# Patient Record
Sex: MALE | Race: WHITE | Employment: OTHER | URBAN - METROPOLITAN AREA
[De-identification: names, ages, dates, MRNs, and addresses within clinical notes are randomized per-mention and may not be internally consistent; named-entity substitution may affect disease eponyms.]

---

## 2023-11-07 ENCOUNTER — OFFICE VISIT (OUTPATIENT)
Dept: ENT CLINIC | Age: 73
End: 2023-11-07
Payer: COMMERCIAL

## 2023-11-07 VITALS
TEMPERATURE: 97.5 F | BODY MASS INDEX: 28.32 KG/M2 | HEIGHT: 65 IN | HEART RATE: 64 BPM | DIASTOLIC BLOOD PRESSURE: 81 MMHG | SYSTOLIC BLOOD PRESSURE: 139 MMHG | OXYGEN SATURATION: 98 % | WEIGHT: 170 LBS | RESPIRATION RATE: 12 BRPM

## 2023-11-07 DIAGNOSIS — Z01.818 PREOP TESTING: ICD-10-CM

## 2023-11-07 DIAGNOSIS — H61.23 BILATERAL IMPACTED CERUMEN: ICD-10-CM

## 2023-11-07 DIAGNOSIS — H93.8X1 MASS OF EAR CANAL, RIGHT: Primary | ICD-10-CM

## 2023-11-07 PROCEDURE — 69210 REMOVE IMPACTED EAR WAX UNI: CPT

## 2023-11-07 PROCEDURE — 99204 OFFICE O/P NEW MOD 45 MIN: CPT

## 2023-11-07 PROCEDURE — 1123F ACP DISCUSS/DSCN MKR DOCD: CPT

## 2023-11-07 RX ORDER — PROPRANOLOL HYDROCHLORIDE 120 MG/1
120 CAPSULE, EXTENDED RELEASE ORAL DAILY
COMMUNITY
Start: 2023-09-20

## 2023-11-07 RX ORDER — AZELASTINE HYDROCHLORIDE, FLUTICASONE PROPIONATE 137; 50 UG/1; UG/1
SPRAY, METERED NASAL
COMMUNITY
Start: 2023-09-13

## 2023-11-07 RX ORDER — LORAZEPAM 0.5 MG/1
TABLET ORAL
COMMUNITY
Start: 2023-10-09

## 2023-11-07 RX ORDER — FLUOCINONIDE 0.5 MG/G
OINTMENT TOPICAL
COMMUNITY
Start: 2023-10-18

## 2023-11-07 RX ORDER — CLOBETASOL PROPIONATE 0.46 MG/ML
SOLUTION TOPICAL
COMMUNITY
Start: 2023-10-24

## 2023-11-07 RX ORDER — ATOMOXETINE 80 MG/1
CAPSULE ORAL
COMMUNITY
Start: 2023-09-03

## 2023-11-07 RX ORDER — LISINOPRIL 40 MG/1
40 TABLET ORAL DAILY
COMMUNITY
Start: 2023-10-12

## 2023-11-07 RX ORDER — EZETIMIBE 10 MG/1
10 TABLET ORAL DAILY
COMMUNITY
Start: 2023-09-15

## 2023-11-07 RX ORDER — MONTELUKAST SODIUM 10 MG/1
10 TABLET ORAL DAILY
COMMUNITY
Start: 2023-09-19

## 2023-11-07 RX ORDER — ATORVASTATIN CALCIUM 80 MG/1
80 TABLET, FILM COATED ORAL DAILY
COMMUNITY
Start: 2023-10-16

## 2023-11-07 RX ORDER — SELENIUM SULFIDE 2.5 MG/100ML
LOTION TOPICAL
COMMUNITY
Start: 2023-08-29

## 2023-11-07 RX ORDER — CALCIPOTRIENE 50 UG/G
CREAM TOPICAL
COMMUNITY
Start: 2023-10-24

## 2023-11-07 RX ORDER — LEVOTHYROXINE SODIUM 0.07 MG/1
TABLET ORAL
COMMUNITY
Start: 2023-10-09

## 2023-11-07 ASSESSMENT — ENCOUNTER SYMPTOMS
SINUS PRESSURE: 0
BACK PAIN: 0
COUGH: 0
EYE PAIN: 0
RHINORRHEA: 0
VOMITING: 0
SHORTNESS OF BREATH: 0
DIARRHEA: 0
EYE DISCHARGE: 0
SORE THROAT: 0
ALLERGIC/IMMUNOLOGIC NEGATIVE: 1

## 2023-11-07 NOTE — PROGRESS NOTES
Subjective:      Patient ID:  Corey Thomason is a 72 y.o. male. HPI:    Patient presents today with a mild problem of the bilateral ear. It started 1 year ago. Patient states that nothing makes it better and nothing makes it worse. Pain: no   Side: bilateral  Drainage:  no   Side: bilateral   Trauma history to the ear:    Side: bilateral   Desc:   none  Hearing Aids: no  History of surgery to the head/neck: no   Description: none  History of cerumen impaction: no  History of noise exposure: yes   Type: playing in bands  Spinning: no  Hearing loss: no    Fluctuating: no  Aural pressure: yes  Tinnitus: yes - does report bilateral hissing sound that is non-pulsatile and constant in nature. Otalgia: no    Patient has long history of severe allergies. Has had immunotherapy in the past.   August of 2022 patient started to have return of allergy symptoms. In November of 2022 started to experience sensation of cotton in ears. Was referred by Dr. Christophe Nuñez. Has been on Dimista and singular and has started to improve allergy symptoms over the past month or two. History reviewed. No pertinent past medical history. History reviewed. No pertinent surgical history. History reviewed. No pertinent family history. Social History     Socioeconomic History    Marital status: Single     Spouse name: None    Number of children: None    Years of education: None    Highest education level: None   Tobacco Use    Smoking status: Never    Smokeless tobacco: Never   Substance and Sexual Activity    Alcohol use: Yes     Comment: socially    Drug use: Never     Allergies   Allergen Reactions    Penicillins     Sulfa Antibiotics          Review of Systems   Constitutional:  Negative for chills and fever. HENT:  Positive for congestion, ear pain, hearing loss and tinnitus. Negative for ear discharge, postnasal drip, rhinorrhea, sinus pressure, sneezing and sore throat. Eyes:  Negative for pain and discharge.

## 2023-11-15 ENCOUNTER — HOSPITAL ENCOUNTER (OUTPATIENT)
Age: 73
Discharge: HOME | End: 2023-11-15
Payer: COMMERCIAL

## 2023-11-15 DIAGNOSIS — Z01.818 PREOP TESTING: ICD-10-CM

## 2023-11-15 LAB
BUN SERPL-MCNC: 12 MG/DL (ref 6–23)
CREAT SERPL-MCNC: 0.8 MG/DL (ref 0.7–1.2)
GFR SERPL CREATININE-BSD FRML MDRD: >60 ML/MIN/1.73M2

## 2023-11-15 PROCEDURE — 36415 COLL VENOUS BLD VENIPUNCTURE: CPT

## 2023-11-15 PROCEDURE — 82565 ASSAY OF CREATININE: CPT

## 2023-11-15 PROCEDURE — 84520 ASSAY OF UREA NITROGEN: CPT

## 2023-11-17 ENCOUNTER — TELEPHONE (OUTPATIENT)
Dept: ENT CLINIC | Age: 73
End: 2023-11-17

## 2023-11-17 NOTE — TELEPHONE ENCOUNTER
Nicolette Lawson w/[facility] radiology called to discuss CT IAC W WO Contrast. States radiologist wanted to verify medical necessity of with and without contrast so the patient did not need radiated twice, or if the test could either be performed just \"with\" or just \"without\". Advised I would forward the message to ordering provider and call with response.

## 2023-11-20 NOTE — TELEPHONE ENCOUNTER
Contacted radiolgy to advise provider ok'd change to CT IAC with Contrast only. New order submitted.

## 2023-12-07 ENCOUNTER — TELEPHONE (OUTPATIENT)
Dept: ENT CLINIC | Age: 73
End: 2023-12-07

## 2023-12-07 NOTE — TELEPHONE ENCOUNTER
Patient's CT IAC got denied but our office was never notified. Alessia Sears from [facility] reached out to our office 12/6/23 to let us know she got the denial and that she scanned it into patient's chart. Denial requested we change CT order to MRI as diagnosis for procedure was listed as tinnitus and failed audio. 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 Denial paperwork is dated 11/22/23, patient already had CT IAC done on 11/21/23. MA faxed appeal request to [employer] 12/6/23, fax confirmation scanned into patient's chart and copy of it is in radiology binder. Reference #: [id number].    Electronically signed by Gissel Fierro MA on 12/7/23 at 2:33 PM EST

## 2023-12-11 ENCOUNTER — PROCEDURE VISIT (OUTPATIENT)
Dept: AUDIOLOGY | Age: 73
End: 2023-12-11
Payer: COMMERCIAL

## 2023-12-11 ENCOUNTER — OFFICE VISIT (OUTPATIENT)
Dept: ENT CLINIC | Age: 73
End: 2023-12-11
Payer: COMMERCIAL

## 2023-12-11 VITALS
BODY MASS INDEX: 28.32 KG/M2 | RESPIRATION RATE: 18 BRPM | OXYGEN SATURATION: 98 % | WEIGHT: 170 LBS | HEIGHT: 65 IN | SYSTOLIC BLOOD PRESSURE: 136 MMHG | DIASTOLIC BLOOD PRESSURE: 75 MMHG | HEART RATE: 67 BPM

## 2023-12-11 DIAGNOSIS — H65.92 MEE (MIDDLE EAR EFFUSION), LEFT: ICD-10-CM

## 2023-12-11 DIAGNOSIS — H61.811: ICD-10-CM

## 2023-12-11 DIAGNOSIS — R93.0 ABNORMAL HEAD CT: Primary | ICD-10-CM

## 2023-12-11 DIAGNOSIS — H93.8X3 EAR PRESSURE, BILATERAL: ICD-10-CM

## 2023-12-11 DIAGNOSIS — H90.3 SENSORINEURAL HEARING LOSS (SNHL) OF BOTH EARS: Primary | ICD-10-CM

## 2023-12-11 DIAGNOSIS — H90.3 SENSORINEURAL HEARING LOSS (SNHL), BILATERAL: ICD-10-CM

## 2023-12-11 PROCEDURE — 99214 OFFICE O/P EST MOD 30 MIN: CPT

## 2023-12-11 PROCEDURE — 92557 COMPREHENSIVE HEARING TEST: CPT | Performed by: AUDIOLOGIST

## 2023-12-11 PROCEDURE — 1123F ACP DISCUSS/DSCN MKR DOCD: CPT

## 2023-12-11 PROCEDURE — 92567 TYMPANOMETRY: CPT | Performed by: AUDIOLOGIST

## 2023-12-11 NOTE — PROGRESS NOTES
Refill request routed to Dr. Nalini Everett. Patient has switched PCP's   This patient was referred for audiometric/tympanometric testing by GWENDOLYN Monet due to hearing loss. Audiometry using pure tone air and bone conduction revealed borderline normal to mild sensorineural hearing loss through 500 Hz sloping to a moderately severe sensorineural hearing loss bilaterally. Reliability was good. Speech reception thresholds were in good agreement with the pure tone averages, bilaterally. Speech discrimination scores were excellent, bilaterally. Tympanometry revealed normal middle ear peak pressure and compliance, in the right ear. Left ear revealed flat tympanogram.          The results were reviewed with the patient and CNP. Recommendations for follow up will be made pending physician consult.     Lilian Oates/CCC-A  [facility],[place] [address] Lic # [id number]

## 2023-12-11 NOTE — PROGRESS NOTES
sinus disease and severe right  maxillary sinus disease are identified. IMPRESSION:  Small bony exostosis involving the right external ear canal.  This finding  could be related to repeated cold water exposure. No neoplastic disease,  erosion, or inflammation is detected     Mild opacification of Prussak space involving the left middle ear cavity. This finding could represent a small cholesteatoma. Small left mastoid  effusion is additionally noted. Severe paranasal sinus disease greater on the left. Specimen Collected: 11/24/23 08:11 EST Last Resulted: 11/24/23 10:53 EST         Audiogram -          An audiogram was ordered, obtained and reviewed by me, in order to evaluate the hearing loss associated with abnormal auditory perception. Border normal sloping to moderately severe sensorineural hearing loss bilaterally. Discrimination    Right - 96%    Left - 100%     Tympanogram -    Right - Type A    Pressure - normal     Left   - Type B    Pressure - np        Assessment:       Diagnosis Orders   1. Abnormal head CT  Ambulatory referral to ENT      2. Ear pressure, bilateral  Ambulatory referral to ENT      3. Sensorineural hearing loss (SNHL), bilateral  Ambulatory referral to ENT      4. FRANCISCO JAVIER (middle ear effusion), left  Ambulatory referral to ENT      5. Exostosis of right external auditory canal                Plan:      Joanie Loredo is a 57-year-old male patient who returns the office today for review of CT IAC due to right ear canal mass. As noted above CT IAC reveals exostosis over the right external auditory canal.  However it also reveals a possible small cholesteatoma located within the left Prussak's  space. .  A complete audiogram was performed and revealed borderline normal to moderately severe sensorineural hearing loss bilaterally patient was offered bilateral hearing aids however did not wish to pursue them at this time. Examination also revealed a left middle ear effusion.

## 2023-12-19 NOTE — TELEPHONE ENCOUNTER
Paperwork received from [employer] and scanned into chart stating the appeal is under review and they will respond within 30 days.  Electronically signed by Alysia Sorto LPN on 12/19/2023 at 11:55 AM

## 2024-01-20 NOTE — PROGRESS NOTES
NEW PATIENT VISIT  Chief Complaint   Patient presents with    New Patient     Patient presents after seeing Delio Hitchcock for possible left cholesteatoma, CT imaging was obtained.        History of Present Illness:     Dipak Nguyen is a 73 y.o. male presenting with left ear issues which have started to improve since using his allergy medication, CT scan reviewed with small right exostoses and left soft tissue/fluid in middle ear and mastoid, no bony destruction in mastoid, stapes not well seen) Patient of Delio Coretta; long history of allergy symptoms.   Has had allergy shot in the past.   Was doing good from the 1990's until August of 2022 at which time he had return of allergy symptoms.   At that time was restarted on Flonase and claritin  After experiencing continued symptoms was started on singulair.   Returned to Dr. Lee and was started on Dymista             TOBACCO  Social History     Tobacco Use   Smoking Status Never   Smokeless Tobacco Never        ALCOHOL   Social History     Substance and Sexual Activity   Alcohol Use Yes    Comment: socially        DRUGHX   Social History     Substance and Sexual Activity   Drug Use Never        CURRENT OUTPATIENT MEDICATIONS:   Outpatient Medications Marked as Taking for the 1/22/24 encounter (Office Visit) with Mariella Cole MD   Medication Sig Dispense Refill    atomoxetine (STRATTERA) 80 MG capsule TAKE ONE CAPSULE BY MOUTH ONCE DAILY FOR 90 DAYS      atorvastatin (LIPITOR) 80 MG tablet Take 1 tablet by mouth daily      Azelastine-Fluticasone 137-50 MCG/ACT SUSP USE 1 SPRAY IN EACH NOSTRIL TWICE DAILY      calcipotriene (DOVONEX) 0.005 % cream APPLY TOPICALLY TWICE DAILY TO BODY RASH ON WEEKENDS      clobetasol (TEMOVATE) 0.05 % external solution APPLY TOPICALLY TWICE DAILY TO SCALP AND TAPER OFF      ezetimibe (ZETIA) 10 MG tablet Take 1 tablet by mouth daily      fluocinonide (LIDEX) 0.05 % ointment APPLY TO BODY ON THE WEEKDAYS AS NEEDED WITH FLARES

## 2024-01-22 ENCOUNTER — OFFICE VISIT (OUTPATIENT)
Dept: ENT CLINIC | Age: 74
End: 2024-01-22
Payer: COMMERCIAL

## 2024-01-22 VITALS — HEIGHT: 65 IN | BODY MASS INDEX: 28.32 KG/M2 | WEIGHT: 170 LBS

## 2024-01-22 DIAGNOSIS — H61.811: ICD-10-CM

## 2024-01-22 DIAGNOSIS — H65.92 MEE (MIDDLE EAR EFFUSION), LEFT: ICD-10-CM

## 2024-01-22 DIAGNOSIS — H90.3 SENSORINEURAL HEARING LOSS (SNHL), BILATERAL: Primary | ICD-10-CM

## 2024-01-22 PROCEDURE — 99214 OFFICE O/P EST MOD 30 MIN: CPT | Performed by: OTOLARYNGOLOGY

## 2024-01-22 PROCEDURE — 1123F ACP DISCUSS/DSCN MKR DOCD: CPT | Performed by: OTOLARYNGOLOGY

## 2024-04-26 ENCOUNTER — TELEPHONE (OUTPATIENT)
Dept: ENT CLINIC | Age: 74
End: 2024-04-26

## 2024-04-26 NOTE — TELEPHONE ENCOUNTER
Patient called office and is requesting if he needs to continue to see otologist or if he can return to general ENT due to change in provider status of Dr Cole. Please advise

## 2024-06-27 ENCOUNTER — OFFICE VISIT (OUTPATIENT)
Dept: ENT CLINIC | Age: 74
End: 2024-06-27
Payer: COMMERCIAL

## 2024-06-27 VITALS
WEIGHT: 167 LBS | DIASTOLIC BLOOD PRESSURE: 79 MMHG | HEIGHT: 65 IN | BODY MASS INDEX: 27.82 KG/M2 | SYSTOLIC BLOOD PRESSURE: 129 MMHG | HEART RATE: 60 BPM

## 2024-06-27 DIAGNOSIS — H69.93 DYSFUNCTION OF BOTH EUSTACHIAN TUBES: ICD-10-CM

## 2024-06-27 DIAGNOSIS — H61.21 IMPACTED CERUMEN OF RIGHT EAR: Primary | ICD-10-CM

## 2024-06-27 PROCEDURE — 69210 REMOVE IMPACTED EAR WAX UNI: CPT

## 2024-06-27 PROCEDURE — 99213 OFFICE O/P EST LOW 20 MIN: CPT

## 2024-06-27 PROCEDURE — 1123F ACP DISCUSS/DSCN MKR DOCD: CPT

## 2024-06-27 ASSESSMENT — ENCOUNTER SYMPTOMS
EYE PAIN: 0
BACK PAIN: 0
COUGH: 0
DIARRHEA: 0
SINUS PRESSURE: 0
SHORTNESS OF BREATH: 0
RHINORRHEA: 0
ALLERGIC/IMMUNOLOGIC NEGATIVE: 1
VOMITING: 0
SORE THROAT: 0
EYE DISCHARGE: 0

## 2024-06-27 NOTE — PROGRESS NOTES
Subjective:      Patient ID:  Dipak Nguyen is a 73 y.o. male.    HPI:    Patient presents today for recheck of the bilateral ear. And allergies.   Previously seen by Dr. Cole for abnormal CT IAC.    small right exostoses and left soft tissue/fluid in middle ear and mastoid, no bony destruction in mastoid, stapes not well seen   Patient is  doing better.  Drops:no  Pain: no  Drainage: no   Wick: no  Cerumen impaction: no    Patient he has felt the best over the past 3 months, that he has felt in a long time.   States \"cotton in the ear\" feeling has improved.   Has been working with Dr. Lee for management of allergy symptoms.   Currently taking Claritin PRN, Dimista BID,and Montelukast       Past Medical History:   Diagnosis Date    Psoriasis      History reviewed. No pertinent surgical history.  History reviewed. No pertinent family history.  Social History     Socioeconomic History    Marital status: Single     Spouse name: None    Number of children: None    Years of education: None    Highest education level: None   Tobacco Use    Smoking status: Never    Smokeless tobacco: Never   Substance and Sexual Activity    Alcohol use: Yes     Comment: socially    Drug use: Never     Allergies   Allergen Reactions    Penicillins     Sulfa Antibiotics          Review of Systems   Constitutional:  Negative for chills and fever.   HENT:  Positive for hearing loss and tinnitus. Negative for congestion, ear discharge, ear pain, postnasal drip, rhinorrhea, sinus pressure, sneezing and sore throat.    Eyes:  Negative for pain and discharge.   Respiratory:  Negative for cough and shortness of breath.    Cardiovascular:  Negative for chest pain.   Gastrointestinal:  Negative for diarrhea and vomiting.   Genitourinary:  Negative for flank pain.   Musculoskeletal:  Negative for back pain and neck pain.   Skin:  Negative for rash.   Allergic/Immunologic: Negative.    Neurological:  Negative for syncope and headaches.   All